# Patient Record
Sex: FEMALE | Race: BLACK OR AFRICAN AMERICAN | NOT HISPANIC OR LATINO
[De-identification: names, ages, dates, MRNs, and addresses within clinical notes are randomized per-mention and may not be internally consistent; named-entity substitution may affect disease eponyms.]

---

## 2020-12-30 ENCOUNTER — DASHBOARD ENCOUNTERS (OUTPATIENT)
Age: 85
End: 2020-12-30

## 2020-12-30 ENCOUNTER — OFFICE VISIT (OUTPATIENT)
Dept: URBAN - METROPOLITAN AREA CLINIC 92 | Facility: CLINIC | Age: 85
End: 2020-12-30
Payer: MEDICARE

## 2020-12-30 DIAGNOSIS — R19.4 CHANGE IN BOWEL FUNCTION: ICD-10-CM

## 2020-12-30 DIAGNOSIS — R11.2 NAUSEA & VOMITING: ICD-10-CM

## 2020-12-30 DIAGNOSIS — R63.4 WEIGHT LOSS: ICD-10-CM

## 2020-12-30 PROCEDURE — 99243 OFF/OP CNSLTJ NEW/EST LOW 30: CPT | Performed by: INTERNAL MEDICINE

## 2020-12-30 PROCEDURE — G9903 PT SCRN TBCO ID AS NON USER: HCPCS | Performed by: INTERNAL MEDICINE

## 2020-12-30 PROCEDURE — G8482 FLU IMMUNIZE ORDER/ADMIN: HCPCS | Performed by: INTERNAL MEDICINE

## 2020-12-30 PROCEDURE — 99203 OFFICE O/P NEW LOW 30 MIN: CPT | Performed by: INTERNAL MEDICINE

## 2020-12-30 PROCEDURE — 1036F TOBACCO NON-USER: CPT | Performed by: INTERNAL MEDICINE

## 2020-12-30 RX ORDER — GABAPENTIN 100 MG/1
CAPSULE ORAL
Qty: 30 UNSPECIFIED | Status: ACTIVE | COMMUNITY

## 2020-12-30 RX ORDER — ATORVASTATIN CALCIUM, FILM COATED 20 MG/1
TABLET ORAL
Qty: 90 UNSPECIFIED | Status: ACTIVE | COMMUNITY

## 2020-12-30 RX ORDER — METOPROLOL SUCCINATE 50 MG/1
TABLET, EXTENDED RELEASE ORAL
Qty: 90 UNSPECIFIED | Status: ACTIVE | COMMUNITY

## 2020-12-30 RX ORDER — TRAMADOL HYDROCHLORIDE 50 MG/1
TABLET, FILM COATED ORAL
Qty: 30 UNSPECIFIED | Status: ACTIVE | COMMUNITY

## 2020-12-30 NOTE — HPI-TODAY'S VISIT:
This is a 89-year-old -American female presents today with her daughter for evaluation.  They have been referred from Dr. Murphy.  A copy this report will be sent to his office. The patient is a very poor historian her daughter is able to provide some of the information.  She notes that she was started on dialysis approximately 2 years ago since that she has lost about 60 pounds.  She has had intermittent nausea and changes in bowel habits.  Although for the last several months she has had no particular gastrointestinal complaints.  She has not previously had an upper endoscopy or colonoscopy.  She is quite frail appearing in a wheelchair.

## 2021-02-10 ENCOUNTER — OFFICE VISIT (OUTPATIENT)
Dept: URBAN - METROPOLITAN AREA CLINIC 92 | Facility: CLINIC | Age: 86
End: 2021-02-10